# Patient Record
Sex: MALE | Race: ASIAN | NOT HISPANIC OR LATINO | URBAN - METROPOLITAN AREA
[De-identification: names, ages, dates, MRNs, and addresses within clinical notes are randomized per-mention and may not be internally consistent; named-entity substitution may affect disease eponyms.]

---

## 2017-05-25 ENCOUNTER — EMERGENCY (EMERGENCY)
Facility: HOSPITAL | Age: 42
LOS: 1 days | Discharge: HOME | End: 2017-05-25
Attending: EMERGENCY MEDICINE
Payer: COMMERCIAL

## 2017-05-25 VITALS
SYSTOLIC BLOOD PRESSURE: 118 MMHG | HEART RATE: 66 BPM | WEIGHT: 169.98 LBS | RESPIRATION RATE: 20 BRPM | TEMPERATURE: 97 F | DIASTOLIC BLOOD PRESSURE: 80 MMHG | HEIGHT: 65 IN | OXYGEN SATURATION: 100 %

## 2017-05-25 PROCEDURE — 72125 CT NECK SPINE W/O DYE: CPT

## 2017-05-25 PROCEDURE — 12001 RPR S/N/AX/GEN/TRNK 2.5CM/<: CPT

## 2017-05-25 PROCEDURE — 70450 CT HEAD/BRAIN W/O DYE: CPT | Mod: 26

## 2017-05-25 PROCEDURE — 72125 CT NECK SPINE W/O DYE: CPT | Mod: 26

## 2017-05-25 PROCEDURE — 99284 EMERGENCY DEPT VISIT MOD MDM: CPT | Mod: 25

## 2017-05-25 PROCEDURE — 71046 X-RAY EXAM CHEST 2 VIEWS: CPT

## 2017-05-25 PROCEDURE — 70450 CT HEAD/BRAIN W/O DYE: CPT

## 2017-05-25 PROCEDURE — 71020: CPT | Mod: 26

## 2017-05-25 RX ORDER — ACETAMINOPHEN 500 MG
650 TABLET ORAL ONCE
Qty: 0 | Refills: 0 | Status: COMPLETED | OUTPATIENT
Start: 2017-05-25 | End: 2017-05-25

## 2017-05-25 RX ORDER — METHOCARBAMOL 500 MG/1
1 TABLET, FILM COATED ORAL
Qty: 14 | Refills: 0 | OUTPATIENT
Start: 2017-05-25 | End: 2017-06-01

## 2017-05-25 RX ADMIN — Medication 650 MILLIGRAM(S): at 13:07
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
## 2017-05-25 NOTE — ED STATDOCS - CARE PLAN
Principal Discharge DX:	Injury of head, initial encounter  Secondary Diagnosis:	Laceration of head  Secondary Diagnosis:	Neck pain
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
## 2017-05-25 NOTE — ED STATDOCS - MUSCULOSKELETAL, MLM
No chest wall TTP or crepitus. +Midline C-spine TTP at C2 and C4, FROM C-spine. No other midline T or L-spine TTP. Left shoulder TTP, FROM L shoulder, no ecchymosis or TTP. Pelvis is stable.

## 2017-05-25 NOTE — ED STATDOCS - NEUROLOGICAL, MLM
AAOx3. GCS 15. Ambulatory with normal gait and balance. 5/5 strength of extremities x4. Sensation equal and intact bilaterally of extremities x4.

## 2017-05-25 NOTE — ED STATDOCS - NS ED MD SCRIBE ATTENDING SCRIBE SECTIONS
PAST MEDICAL/SURGICAL/SOCIAL HISTORY/HISTORY OF PRESENT ILLNESS/DISPOSITION/HIV/VITAL SIGNS( Pullset)/PHYSICAL EXAM/REVIEW OF SYSTEMS

## 2017-05-25 NOTE — ED PROCEDURE NOTE - PROCEDURE ADDITIONAL DETAILS
Wound explored. No visible or palpable foreign body. Discussed with PT that I can not rule out foreign body completely. Will have surgical follow up .
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
## 2017-05-25 NOTE — ED STATDOCS - ATTENDING CONTRIBUTION TO CARE
I, Manfred Fajardo, performed the initial face to face bedside interview with this patient regarding history of present illness, review of symptoms and relevant past medical, social and family history.  I completed an independent physical examination.  I was the initial provider who evaluated this patient. I have signed out the follow up of any pending tests (i.e. labs, radiological studies) to the ACP.  I have communicated the patient’s plan of care and disposition with the ACP.  The history, relevant review of systems, past medical and surgical history, medical decision making, and physical examination was documented by the scribe in my presence and I attest to the accuracy of the documentation.

## 2017-05-25 NOTE — ED STATDOCS - ENMT, MLM
Airway patent. Nasal mucosa clear.  Mouth with normal mucosa. No Veras's sign, Raccoon eyes, otorrhea, rhinorrhea.

## 2021-06-10 ENCOUNTER — APPOINTMENT (OUTPATIENT)
Dept: ORTHOPEDIC SURGERY | Facility: CLINIC | Age: 46
End: 2021-06-10
Payer: COMMERCIAL

## 2021-06-10 VITALS — BODY MASS INDEX: 22.53 KG/M2 | WEIGHT: 170 LBS | HEIGHT: 73 IN

## 2021-06-10 DIAGNOSIS — Z87.39 PERSONAL HISTORY OF OTHER DISEASES OF THE MUSCULOSKELETAL SYSTEM AND CONNECTIVE TISSUE: ICD-10-CM

## 2021-06-10 DIAGNOSIS — M77.11 LATERAL EPICONDYLITIS, RIGHT ELBOW: ICD-10-CM

## 2021-06-10 DIAGNOSIS — Z78.9 OTHER SPECIFIED HEALTH STATUS: ICD-10-CM

## 2021-06-10 PROBLEM — Z00.00 ENCOUNTER FOR PREVENTIVE HEALTH EXAMINATION: Status: ACTIVE | Noted: 2021-06-10

## 2021-06-10 PROCEDURE — 73070 X-RAY EXAM OF ELBOW: CPT | Mod: RT

## 2021-06-10 PROCEDURE — 99203 OFFICE O/P NEW LOW 30 MIN: CPT | Mod: 25

## 2021-06-10 PROCEDURE — 99072 ADDL SUPL MATRL&STAF TM PHE: CPT

## 2021-06-10 PROCEDURE — 73110 X-RAY EXAM OF WRIST: CPT | Mod: 50

## 2021-06-10 PROCEDURE — 20600 DRAIN/INJ JOINT/BURSA W/O US: CPT | Mod: RT

## 2021-06-10 RX ORDER — MESALAMINE 1.2 G/1
TABLET, DELAYED RELEASE ORAL
Refills: 0 | Status: ACTIVE | COMMUNITY

## 2021-06-10 NOTE — PHYSICAL EXAM
[de-identified] : Physical exam shows the patient to be alert and oriented x3, capable of ambulation. The patient is well-developed and well-nourished in no apparent respiratory distress. Majority of the skin is intact bilaterally in the upper extremities without lymphadenopathy at the elbows.\par \par The elbows are supple with full range of motion without evidence of effusion or instability of the medial lateral collateral ligament complex bilaterally. There is no pain upon forced flexion or extension of the elbows bilaterally. The right] lateral epicondyle is tender to palpation which is accentuated with forced . There is no pain with forced flexion or extension of the elbows bilaterally The biceps and triceps is intact bilaterally with 5 over 5 strength bilaterally. There is no sensitivity over the median, radial or ulnar nerves with provocative maneuvers at the elbows bilaterally. No evidence of intrinsic or extrinsic atrophy bilaterally. \par \par There is mild swelling and tenderness localized over the right TFCC with pain upon forced pronation and supination which is not present on the opposite wrist. There is no tenderness of the ECU bilaterally. There are no masses palpable at the  level the wrists bilaterally.There is symmetric range of motion of the wrists bilaterally without tenderness over the scaphoid scapholunate or lunotriquetral ligaments and no evidence of instability of the wrists and radioulnar joint bilaterally. No tenderness or instability of the CMC joints, pisotriquetral joint, or hamate hook. \par \par Ashish's test shows excellent flow through the radial and ulnar artery was intact arch bilaterally.\par \par There is excellent capillary of the digits bilaterally with sensation grossly intact bilaterally.\par \par There is good capillary refill of the digits bilaterally.There is no masses or sensitivity over the median and ulnar nerves at the level of the wrist. There is a negative Tinel's and negative Phalen's sign bilaterally. The sensation is grossly intact bilaterally. [de-identified] : PA lateral, and oblique, show a ulnar neutral variance, without evidence of soft tissue calcifications. Radiocarpal, midcarpal joints appear to be well preserved.\par \par TA, and lateral of the right elbow shows no evidence of arthritis or fractures. The joint space is well-preserved\par \par

## 2021-06-10 NOTE — ASSESSMENT
[FreeTextEntry1] : A weak history of ulnar-sided wrist pain, which localizes to the TFCC\par \par The risks, benefits, alternatives and associated differential diagnosis was discussed with the patient. Options ranged from conservative care to surgical intervention were reviewed and all questions answered. Patient appeared to have an excellent understanding of the risks as well as differential diagnosis associated with this condition.\par \par Since symptoms have not approved despite rest activity modifications, and wraps. He was elected to proceed with injection.\par \par Patient appears to have a right TFCC injury. The differential diagnosis as well as the risks benefits and alternatives of treatment were discussed. After a thorough discussion of the options and risks associated with each option patient elected to proceed with an injection of Kenalog and lidocaine into the affected TFCC region.\par \par After the area was prepped with alcohol and anesthetized with ethyl chloride 1 cc of Kenalog 10 and 1 cc of 2% lidocaine was instilled into the TFCC region. Hemostasis was obtained by direct pressure. A small Band-Aid was applied.\par \par  A home program was outlined which included immobilization in a splint and activity modifications was elected by the patient over other alternatives discussed.. Patient will contact this office in the next 4-6 weeks if symptoms do not fully resolve and consideration of followup or MRI will be considered. If symptoms are fully resolved the patient will follow on an as-needed basis.

## 2021-06-10 NOTE — HISTORY OF PRESENT ILLNESS
[Right] : right hand dominant [FreeTextEntry1] : Patient presents with right lateral elbow pain and right ulnar sided wrist pain for the past 3 weeks. He fell a month ago while playing tennis and landed on the right hand but is not sure if that is the cause of his symptom. He has been wearing a splint for a few days.\par \par  He fell 2 months ago on his right wrist, but had no symptoms of pain after the fall for at least a month. He denies any new history of trauma or mechanical symptoms. He has tried rest, activity modification splinting over the past 3 weeks, but has not had any significant improvement. He denies any mechanical symptoms or changes in sensation.

## 2021-07-27 ENCOUNTER — NON-APPOINTMENT (OUTPATIENT)
Age: 46
End: 2021-07-27

## 2021-08-09 ENCOUNTER — APPOINTMENT (OUTPATIENT)
Dept: ORTHOPEDIC SURGERY | Facility: CLINIC | Age: 46
End: 2021-08-09
Payer: COMMERCIAL

## 2021-08-09 PROCEDURE — 99442: CPT

## 2021-09-08 ENCOUNTER — APPOINTMENT (OUTPATIENT)
Dept: ORTHOPEDIC SURGERY | Facility: CLINIC | Age: 46
End: 2021-09-08

## 2021-10-11 ENCOUNTER — NON-APPOINTMENT (OUTPATIENT)
Age: 46
End: 2021-10-11

## 2021-11-09 ENCOUNTER — TRANSCRIPTION ENCOUNTER (OUTPATIENT)
Age: 46
End: 2021-11-09

## 2021-11-10 ENCOUNTER — OUTPATIENT (OUTPATIENT)
Dept: OUTPATIENT SERVICES | Facility: HOSPITAL | Age: 46
LOS: 1 days | Discharge: HOME | End: 2021-11-10

## 2021-11-10 ENCOUNTER — APPOINTMENT (OUTPATIENT)
Dept: ORTHOPEDIC SURGERY | Facility: AMBULATORY SURGERY CENTER | Age: 46
End: 2021-11-10
Payer: COMMERCIAL

## 2021-11-10 PROCEDURE — 29846 WRIST ARTHROSCOPY/SURGERY: CPT | Mod: RT

## 2021-11-22 ENCOUNTER — APPOINTMENT (OUTPATIENT)
Dept: ORTHOPEDIC SURGERY | Facility: CLINIC | Age: 46
End: 2021-11-22
Payer: COMMERCIAL

## 2021-11-22 PROCEDURE — 99024 POSTOP FOLLOW-UP VISIT: CPT

## 2021-11-22 RX ORDER — OXYCODONE AND ACETAMINOPHEN 5; 325 MG/1; MG/1
5-325 TABLET ORAL
Qty: 15 | Refills: 0 | Status: DISCONTINUED | COMMUNITY
Start: 2021-11-09 | End: 2021-11-22

## 2021-12-10 ENCOUNTER — APPOINTMENT (OUTPATIENT)
Dept: ORTHOPEDIC SURGERY | Facility: CLINIC | Age: 46
End: 2021-12-10
Payer: COMMERCIAL

## 2021-12-10 DIAGNOSIS — S69.81XD OTHER SPECIFIED INJURIES OF RIGHT WRIST, HAND AND FINGER(S), SUBSEQUENT ENCOUNTER: ICD-10-CM

## 2021-12-10 PROCEDURE — 99024 POSTOP FOLLOW-UP VISIT: CPT

## 2025-01-02 ENCOUNTER — APPOINTMENT (OUTPATIENT)
Dept: ORTHOPEDIC SURGERY | Age: 50
End: 2025-01-02
Payer: COMMERCIAL

## 2025-01-02 VITALS — BODY MASS INDEX: 22.8 KG/M2 | RESPIRATION RATE: 16 BRPM | HEIGHT: 73 IN | WEIGHT: 172 LBS

## 2025-01-02 DIAGNOSIS — S63.649A SPRAIN OF METACARPOPHALANGEAL JOINT OF UNSPECIFIED THUMB, INITIAL ENCOUNTER: ICD-10-CM

## 2025-01-02 DIAGNOSIS — Z86.69 PERSONAL HISTORY OF OTHER DISEASES OF THE NERVOUS SYSTEM AND SENSE ORGANS: ICD-10-CM

## 2025-01-02 PROCEDURE — 99204 OFFICE O/P NEW MOD 45 MIN: CPT

## 2025-01-02 PROCEDURE — 73130 X-RAY EXAM OF HAND: CPT | Mod: RT

## 2025-01-02 RX ORDER — MESALAMINE 1.2 G/1
1.2 TABLET, DELAYED RELEASE ORAL
Refills: 0 | Status: ACTIVE | COMMUNITY

## 2025-01-13 ENCOUNTER — APPOINTMENT (OUTPATIENT)
Dept: ORTHOPEDIC SURGERY | Age: 50
End: 2025-01-13
Payer: COMMERCIAL

## 2025-01-13 ENCOUNTER — NON-APPOINTMENT (OUTPATIENT)
Age: 50
End: 2025-01-13

## 2025-01-13 DIAGNOSIS — S63.649A SPRAIN OF METACARPOPHALANGEAL JOINT OF UNSPECIFIED THUMB, INITIAL ENCOUNTER: ICD-10-CM

## 2025-01-13 PROCEDURE — 99449 NTRPROF PH1/NTRNET/EHR 31/>: CPT
